# Patient Record
Sex: MALE | Race: WHITE | Employment: OTHER | URBAN - METROPOLITAN AREA
[De-identification: names, ages, dates, MRNs, and addresses within clinical notes are randomized per-mention and may not be internally consistent; named-entity substitution may affect disease eponyms.]

---

## 2017-01-12 ENCOUNTER — OFFICE VISIT (OUTPATIENT)
Dept: UROLOGY | Age: 67
End: 2017-01-12

## 2017-01-12 VITALS
WEIGHT: 150 LBS | HEART RATE: 99 BPM | BODY MASS INDEX: 22.22 KG/M2 | SYSTOLIC BLOOD PRESSURE: 120 MMHG | HEIGHT: 69 IN | DIASTOLIC BLOOD PRESSURE: 82 MMHG

## 2017-01-12 DIAGNOSIS — R33.9 URINARY RETENTION: Primary | ICD-10-CM

## 2017-01-12 LAB — POST VOID RESIDUAL (PVR): >999 ML

## 2017-01-12 PROCEDURE — 4004F PT TOBACCO SCREEN RCVD TLK: CPT | Performed by: UROLOGY

## 2017-01-12 PROCEDURE — 3017F COLORECTAL CA SCREEN DOC REV: CPT | Performed by: UROLOGY

## 2017-01-12 PROCEDURE — G8419 CALC BMI OUT NRM PARAM NOF/U: HCPCS | Performed by: UROLOGY

## 2017-01-12 PROCEDURE — 1123F ACP DISCUSS/DSCN MKR DOCD: CPT | Performed by: UROLOGY

## 2017-01-12 PROCEDURE — G8427 DOCREV CUR MEDS BY ELIG CLIN: HCPCS | Performed by: UROLOGY

## 2017-01-12 PROCEDURE — G8484 FLU IMMUNIZE NO ADMIN: HCPCS | Performed by: UROLOGY

## 2017-01-12 PROCEDURE — 51798 US URINE CAPACITY MEASURE: CPT | Performed by: UROLOGY

## 2017-01-12 PROCEDURE — 99213 OFFICE O/P EST LOW 20 MIN: CPT | Performed by: UROLOGY

## 2017-01-12 PROCEDURE — 4040F PNEUMOC VAC/ADMIN/RCVD: CPT | Performed by: UROLOGY

## 2017-01-12 RX ORDER — RISPERIDONE 3 MG/1
TABLET, FILM COATED ORAL
Refills: 2 | COMMUNITY
Start: 2016-12-31 | End: 2020-02-18 | Stop reason: ALTCHOICE

## 2017-01-12 RX ORDER — TAMSULOSIN HYDROCHLORIDE 0.4 MG/1
CAPSULE ORAL
Qty: 180 CAPSULE | Refills: 3 | Status: SHIPPED | OUTPATIENT
Start: 2017-01-12 | End: 2018-01-07 | Stop reason: SDUPTHER

## 2017-01-12 RX ORDER — BENZTROPINE MESYLATE 1 MG/1
1 TABLET ORAL 2 TIMES DAILY
Refills: 2 | COMMUNITY
Start: 2016-12-31

## 2017-01-12 RX ORDER — HALOPERIDOL DECANOATE 100 MG/ML
INJECTION INTRAMUSCULAR
COMMUNITY
End: 2020-02-18 | Stop reason: ALTCHOICE

## 2017-10-05 ENCOUNTER — OFFICE VISIT (OUTPATIENT)
Dept: PULMONOLOGY | Age: 67
End: 2017-10-05

## 2017-10-05 ENCOUNTER — HOSPITAL ENCOUNTER (OUTPATIENT)
Dept: GENERAL RADIOLOGY | Age: 67
Discharge: HOME | End: 2017-10-05
Payer: MEDICARE

## 2017-10-05 VITALS
OXYGEN SATURATION: 96 % | HEIGHT: 69 IN | WEIGHT: 157 LBS | SYSTOLIC BLOOD PRESSURE: 130 MMHG | HEART RATE: 95 BPM | BODY MASS INDEX: 23.25 KG/M2 | TEMPERATURE: 98 F | DIASTOLIC BLOOD PRESSURE: 80 MMHG

## 2017-10-05 DIAGNOSIS — Z72.0 TOBACCO ABUSE: ICD-10-CM

## 2017-10-05 DIAGNOSIS — J44.9 CHRONIC OBSTRUCTIVE PULMONARY DISEASE, UNSPECIFIED COPD TYPE (HCC): ICD-10-CM

## 2017-10-05 DIAGNOSIS — J61 ASBESTOSIS (HCC): ICD-10-CM

## 2017-10-05 PROCEDURE — G8427 DOCREV CUR MEDS BY ELIG CLIN: HCPCS | Performed by: INTERNAL MEDICINE

## 2017-10-05 PROCEDURE — 3017F COLORECTAL CA SCREEN DOC REV: CPT | Performed by: INTERNAL MEDICINE

## 2017-10-05 PROCEDURE — G8926 SPIRO NO PERF OR DOC: HCPCS | Performed by: INTERNAL MEDICINE

## 2017-10-05 PROCEDURE — G8420 CALC BMI NORM PARAMETERS: HCPCS | Performed by: INTERNAL MEDICINE

## 2017-10-05 PROCEDURE — 1123F ACP DISCUSS/DSCN MKR DOCD: CPT | Performed by: INTERNAL MEDICINE

## 2017-10-05 PROCEDURE — G8484 FLU IMMUNIZE NO ADMIN: HCPCS | Performed by: INTERNAL MEDICINE

## 2017-10-05 PROCEDURE — 3023F SPIROM DOC REV: CPT | Performed by: INTERNAL MEDICINE

## 2017-10-05 PROCEDURE — 4040F PNEUMOC VAC/ADMIN/RCVD: CPT | Performed by: INTERNAL MEDICINE

## 2017-10-05 PROCEDURE — 71020 XR CHEST STANDARD TWO VW: CPT

## 2017-10-05 PROCEDURE — 99214 OFFICE O/P EST MOD 30 MIN: CPT | Performed by: INTERNAL MEDICINE

## 2017-10-05 PROCEDURE — 4004F PT TOBACCO SCREEN RCVD TLK: CPT | Performed by: INTERNAL MEDICINE

## 2017-10-05 RX ORDER — ALBUTEROL SULFATE 90 UG/1
2 AEROSOL, METERED RESPIRATORY (INHALATION) EVERY 6 HOURS PRN
COMMUNITY

## 2017-10-05 ASSESSMENT — ENCOUNTER SYMPTOMS
COUGH: 1
SHORTNESS OF BREATH: 0

## 2017-10-05 NOTE — MR AVS SNAPSHOT
After Visit Summary             Debora Hatchet   10/5/2017 11:00 AM   Office Visit    Description:  Male : 1950   Provider:  Karen Lo MD   Department:  [facility]              Your Follow-Up and Future Appointments         Below is a list of your follow-up and future appointments. This may not be a complete list as you may have made appointments directly with providers that we are not aware of or your providers may have made some for you. Please call your providers to confirm appointments. It is important to keep your appointments. Please bring your current insurance card, photo ID, co-pay, and all medication bottles to your appointment. If self-pay, payment is expected at the time of service. Your To-Do List     Future Appointments Provider Department Dept Phone    2018 10:30 AM Karen Lo MD [facility] [phone]    Please arrive 15 minutes prior to appointment time, bring insurance card and photo ID. Future Orders Complete By Expires    FULL PFT STUDY WITH PRE AND POST [PFT30 Custom]  10/19/2017 10/5/2018    XR CHEST STANDARD (2 VW) [79529 Custom]  10/19/2017 10/5/2018    Follow-Up    Return in about 3 months (around 2018) for COPD.          Information from Your Visit        Department     Name Address Phone Fax    [facility] [address] [phone]      You Were Seen for:         Comments    Chronic obstructive pulmonary disease, unspecified COPD type [facility])   [[id number]]         Vital Signs     Blood Pressure Pulse Temperature Height Weight Oxygen Saturation    130/80 (Site: Left Arm, Position: Sitting, Cuff Size: Medium Adult) 95 98 °F (36.7 °C) (Oral) 5' 9\" (1.753 m) 157 lb (71.2 kg) 96%    Body Mass Index Smoking Status                23.18 kg/m2 Current Every Day Smoker             Today's Medication Changes These changes are accurate as of: 10/5/17 11:52 AM.  If you have any questions, ask your nurse or doctor. CHANGE how you take these medications           tamsulosin 0.4 MG capsule   Commonly known as:  FLOMAX   Instructions: Take two at night   Quantity:  180 capsule   Refills:  3   What changed:  Another medication with the same name was removed. Continue taking this medication, and follow the directions you see here.    Changed by:  Harleen Kumari MD            Where to Get Your Medications      These medications were sent to [address] [place] [address] - [address] - P [phone] - F [phone]  [address], [facility] [address]    Hours:  24-hours Phone:  [phone]     umeclidinium-vilanterol 62.5-25 MCG/INH Aepb inhaler               Your Current Medications Are              albuterol sulfate  (90 Base) MCG/ACT inhaler Inhale 2 puffs into the lungs every 6 hours as needed for Wheezing    umeclidinium-vilanterol (ANORO ELLIPTA) 62.5-25 MCG/INH AEPB inhaler Inhale 1 puff into the lungs daily    haloperidol decanoate (HALDOL DECANOATE) 100 MG/ML injection Inject into the muscle every 30 days    CRANBERRY CONCENTRATE PO Take by mouth    benztropine (COGENTIN) 1 MG tablet TAKE ONE TABLET BY MOUTH AT BEDTIME    risperiDONE (RISPERDAL) 3 MG tablet TAKE ONE TABLET BY MOUTH AT BEDTIME    tamsulosin (FLOMAX) 0.4 MG capsule Take two at night    divalproex (DEPAKOTE ER) 250 MG extended release tablet Take 3 tablets by mouth nightly      Allergies              Bee Venom Hives    Benadryl [Diphenhydramine] Hives    Tylenol [Acetaminophen] Itching         Additional Information        Basic Information     Date Of Birth Sex Race Ethnicity Preferred Language    1950 Male White Non-/Non  English      Problem List as of 10/5/2017  Date Reviewed: 10/5/2017                COPD (chronic obstructive pulmonary disease) (HCC)    Abscess of arm, right later time if you forget your password. 8. Enter your e-mail address. You will receive e-mail notification when new information is available in [address] 9. Click Sign Up. You can now view your medical record. Additional Information  If you have questions, please contact the physician practice where you receive care. Remember, [employer] is NOT to be used for urgent needs. For medical emergencies, dial 911. For questions regarding your [employer] account call [phone]. If you have a clinical question, please call your doctor's office.

## 2017-10-05 NOTE — PROGRESS NOTES
Subjective:     Sebastian Gann is a 78 y.o. male who complains today of:     Chief Complaint   Patient presents with    COPD     follow up       HPI  Patient is using anoro ellipta 1 puff daily. proair respiclick 2 puff prn   No c/o  shortness of breath   No wheezing   C/o Cough with  Clear  Sputum in morning. No Hemoptysis  No Chest pain or pleuritic pain  No Fever or chills. No Rhinorrhea or postnasal drip. He used to smoke 3 ppd but now smoking 1 ppd. Allergies:  Bee venom; Benadryl [diphenhydramine]; and Tylenol [acetaminophen]  Past Medical History:   Diagnosis Date    [facility]osis [facility]     [facility]     schizo    COPD (chronic obstructive [facility] disease) ([facility]      Past Surgical History:   Procedure Laterality Date    INCISION AND DRAINAGE Right 11/22/2016    ARM INCISION AND DRAINAGE with  debridement performed by Cynthia Carlson MD at [address] History   Problem Relation Age of Onset    Hypertension Mother     No Known Problems Father      Social History     Social History    Marital status: Single     Spouse name: N/A    Number of children: N/A    Years of education: N/A     Occupational History    Not on file. Social History Main Topics    Smoking status: Current Every Day Smoker     Packs/day: 1.00     Years: 25.00     Types: Cigarettes    Smokeless tobacco: Never Used    Alcohol use No    Drug use: No    Sexual activity: Not Currently     Other Topics Concern    Not on file     Social History Narrative         Review of Systems   Respiratory: Positive for cough. Negative for shortness of breath. Objective:     Vitals:    10/05/17 1124   BP: 130/80   Site: Left Arm   Position: Sitting   Cuff Size: Medium Adult   Pulse: 95   Temp: 98 °F (36.7 °C)   TempSrc: Oral   SpO2: 96%   Weight: 157 lb (71.2 kg)   Height: 5' 9\" (1.753 m)         Physical Exam   Constitutional: He is oriented to person, place, and time.  He appears well-developed and necessary. He is not having shortness of breath or wheezing he does have a cough in the morning which is chronic in nature. Refill of Anoro Ellipta done    - XR CHEST STANDARD (2 VW); Future  - FULL PFT STUDY WITH PRE AND POST; Future  - umeclidinium-vilanterol (ANORO ELLIPTA) 62.5-25 MCG/INH AEPB inhaler; Inhale 1 puff into the lungs daily  Dispense: 1 each; Refill: 3    2. Asbestosis [facility])  Patient said he had worked in [employer] and he was exposed to asbestos. He was evaluated for asbestosis and past and he was told he has asbestosis. Chest x-ray requested  - XR CHEST STANDARD (2 VW); Future    3. Tobacco abuse  Patient used to smoke 3 packs per day, he has cut down but he is still smoking 1 pack a day  Patient advised try to quit smoking. Risks related to smoking explained to the patient. Return in about 3 months (around 1/5/2018) for COPD.       Jada Fan MD General

## 2017-11-09 ENCOUNTER — HOSPITAL ENCOUNTER (OUTPATIENT)
Dept: PULMONOLOGY | Age: 67
Discharge: HOME | End: 2017-11-09
Payer: MEDICARE

## 2018-01-08 RX ORDER — TAMSULOSIN HYDROCHLORIDE 0.4 MG/1
CAPSULE ORAL
Qty: 180 CAPSULE | Refills: 0 | Status: SHIPPED | OUTPATIENT
Start: 2018-01-08 | End: 2018-04-01 | Stop reason: SDUPTHER

## 2018-04-03 RX ORDER — TAMSULOSIN HYDROCHLORIDE 0.4 MG/1
CAPSULE ORAL
Qty: 180 CAPSULE | Refills: 3 | Status: SHIPPED | OUTPATIENT
Start: 2018-04-03 | End: 2020-02-18 | Stop reason: ALTCHOICE

## 2018-07-17 DIAGNOSIS — J44.9 CHRONIC OBSTRUCTIVE PULMONARY DISEASE, UNSPECIFIED COPD TYPE (HCC): ICD-10-CM

## 2018-07-18 RX ORDER — UMECLIDINIUM BROMIDE AND VILANTEROL TRIFENATATE 62.5; 25 UG/1; UG/1
POWDER RESPIRATORY (INHALATION)
Qty: 1 EACH | Refills: 3 | Status: SHIPPED | OUTPATIENT
Start: 2018-07-18 | End: 2020-02-18 | Stop reason: ALTCHOICE

## 2019-02-21 ENCOUNTER — APPOINTMENT (OUTPATIENT)
Dept: GENERAL RADIOLOGY | Age: 69
DRG: 871 | End: 2019-02-21
Payer: MEDICARE

## 2019-02-21 ENCOUNTER — APPOINTMENT (OUTPATIENT)
Dept: CT IMAGING | Age: 69
DRG: 871 | End: 2019-02-21
Payer: MEDICARE

## 2019-02-21 ENCOUNTER — HOSPITAL ENCOUNTER (INPATIENT)
Age: 69
LOS: 5 days | Discharge: HOME | DRG: 871 | End: 2019-02-28
Attending: STUDENT IN AN ORGANIZED HEALTH CARE EDUCATION/TRAINING PROGRAM | Admitting: INTERNAL MEDICINE
Payer: MEDICARE
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
## 2019-02-21 DIAGNOSIS — T17.908A ASPIRATION INTO AIRWAY, INITIAL ENCOUNTER: ICD-10-CM

## 2019-02-21 DIAGNOSIS — R50.9 ACUTE FEBRILE ILLNESS: ICD-10-CM

## 2019-02-21 DIAGNOSIS — N17.9 AKI (ACUTE KIDNEY INJURY) (HCC): ICD-10-CM

## 2019-02-21 DIAGNOSIS — S83.105A LEFT KNEE DISLOCATION, INITIAL ENCOUNTER: ICD-10-CM

## 2019-02-21 DIAGNOSIS — R65.10 SIRS (SYSTEMIC INFLAMMATORY RESPONSE SYNDROME) (HCC): ICD-10-CM

## 2019-02-21 DIAGNOSIS — R41.0 DISORIENTATION: Primary | ICD-10-CM

## 2019-02-21 DIAGNOSIS — F14.10 COCAINE ABUSE (HCC): ICD-10-CM

## 2019-02-21 DIAGNOSIS — S09.90XA CLOSED HEAD INJURY, INITIAL ENCOUNTER: ICD-10-CM

## 2019-02-21 LAB
ABO/RH: NORMAL
ALBUMIN SERPL-MCNC: 4.4 G/DL (ref 3.5–4.6)
ALP BLD-CCNC: 56 U/L (ref 35–104)
ALT SERPL-CCNC: 14 U/L (ref 0–41)
AMMONIA: 35 UMOL/L (ref 16–60)
AMPHETAMINE SCREEN, URINE: ABNORMAL
ANION GAP SERPL CALCULATED.3IONS-SCNC: 18 MEQ/L (ref 9–15)
ANTIBODY SCREEN: NORMAL
APTT: 30.7 SEC (ref 21.6–35.4)
AST SERPL-CCNC: 31 U/L (ref 0–40)
BACTERIA: NEGATIVE /HPF
BARBITURATE SCREEN URINE: ABNORMAL
BASOPHILS ABSOLUTE: 0 K/UL (ref 0–0.2)
BASOPHILS RELATIVE PERCENT: 0.3 %
BENZODIAZEPINE SCREEN, URINE: ABNORMAL
BILIRUB SERPL-MCNC: 0.3 MG/DL (ref 0.2–0.7)
BILIRUBIN URINE: NEGATIVE
BLOOD, URINE: ABNORMAL
BUN BLDV-MCNC: 24 MG/DL (ref 8–23)
CALCIUM SERPL-MCNC: 8.5 MG/DL (ref 8.5–9.9)
CANNABINOID SCREEN URINE: POSITIVE
CHLORIDE BLD-SCNC: 95 MEQ/L (ref 95–107)
CHP ED QC CHECK: YES
CK MB: 4.3 NG/ML (ref 0–6.7)
CLARITY: CLEAR
CO2: 22 MEQ/L (ref 20–31)
COCAINE METABOLITE SCREEN URINE: POSITIVE
COLOR: YELLOW
CREAT SERPL-MCNC: 2.33 MG/DL (ref 0.7–1.2)
CREATINE KINASE-MB INDEX: 0.5 % (ref 0–3.5)
EKG ATRIAL RATE: 119 BPM
EKG P AXIS: 70 DEGREES
EKG P-R INTERVAL: 130 MS
EKG Q-T INTERVAL: 312 MS
EKG QRS DURATION: 82 MS
EKG QTC CALCULATION (BAZETT): 438 MS
EKG R AXIS: 65 DEGREES
EKG T AXIS: 64 DEGREES
EKG VENTRICULAR RATE: 119 BPM
EOSINOPHILS ABSOLUTE: 0 K/UL (ref 0–0.7)
EOSINOPHILS RELATIVE PERCENT: 0 %
EPITHELIAL CELLS, UA: ABNORMAL /HPF (ref 0–5)
GFR AFRICAN AMERICAN: 33.9
GFR NON-AFRICAN AMERICAN: 28
GLOBULIN: 3.9 G/DL (ref 2.3–3.5)
GLUCOSE BLD-MCNC: 102 MG/DL (ref 70–99)
GLUCOSE BLD-MCNC: 112 MG/DL
GLUCOSE BLD-MCNC: 112 MG/DL (ref 60–115)
GLUCOSE URINE: NEGATIVE MG/DL
HCT VFR BLD CALC: 36.2 % (ref 42–52)
HEMOGLOBIN: 12.7 G/DL (ref 14–18)
HYALINE CASTS: ABNORMAL /HPF (ref 0–5)
INR BLD: 1.1
KETONES, URINE: NEGATIVE MG/DL
LACTIC ACID: 0.9 MMOL/L (ref 0.5–2.2)
LEUKOCYTE ESTERASE, URINE: NEGATIVE
LYMPHOCYTES ABSOLUTE: 0.4 K/UL (ref 1–4.8)
LYMPHOCYTES RELATIVE PERCENT: 4.9 %
Lab: ABNORMAL
MAGNESIUM: 1.7 MG/DL (ref 1.7–2.4)
MCH RBC QN AUTO: 35.1 PG (ref 27–31.3)
MCHC RBC AUTO-ENTMCNC: 35.2 % (ref 33–37)
MCV RBC AUTO: 99.6 FL (ref 80–100)
MONO TEST: NEGATIVE
MONOCYTES ABSOLUTE: 1 K/UL (ref 0.2–0.8)
MONOCYTES RELATIVE PERCENT: 13.2 %
NEUTROPHILS ABSOLUTE: 6.1 K/UL (ref 1.4–6.5)
NEUTROPHILS RELATIVE PERCENT: 81.6 %
NITRITE, URINE: NEGATIVE
OPIATE SCREEN URINE: ABNORMAL
PDW BLD-RTO: 14 % (ref 11.5–14.5)
PERFORMED ON: NORMAL
PH UA: 6 (ref 5–9)
PHENCYCLIDINE SCREEN URINE: ABNORMAL
PLATELET # BLD: 142 K/UL (ref 130–400)
POTASSIUM SERPL-SCNC: 3.9 MEQ/L (ref 3.4–4.9)
PRO-BNP: 246 PG/ML
PROTEIN UA: NEGATIVE MG/DL
PROTHROMBIN TIME: 11.1 SEC (ref 9–11.5)
RAPID INFLUENZA  B AGN: NEGATIVE
RAPID INFLUENZA A AGN: NEGATIVE
RBC # BLD: 3.63 M/UL (ref 4.7–6.1)
RBC UA: ABNORMAL /HPF (ref 0–5)
S PYO AG THROAT QL: NEGATIVE
SODIUM BLD-SCNC: 135 MEQ/L (ref 135–144)
SPECIFIC GRAVITY UA: 1.01 (ref 1–1.03)
TOTAL CK: 914 U/L (ref 0–190)
TOTAL PROTEIN: 8.3 G/DL (ref 6.3–8)
TROPONIN: <0.01 NG/ML (ref 0–0.01)
URINE REFLEX TO CULTURE: YES
UROBILINOGEN, URINE: 0.2 E.U./DL
WBC # BLD: 7.4 K/UL (ref 4.8–10.8)
WBC UA: ABNORMAL /HPF (ref 0–5)

## 2019-02-21 PROCEDURE — 2580000003 HC RX 258: Performed by: STUDENT IN AN ORGANIZED HEALTH CARE EDUCATION/TRAINING PROGRAM

## 2019-02-21 PROCEDURE — 96365 THER/PROPH/DIAG IV INF INIT: CPT

## 2019-02-21 PROCEDURE — 80053 COMPREHEN METABOLIC PANEL: CPT

## 2019-02-21 PROCEDURE — 86900 BLOOD TYPING SEROLOGIC ABO: CPT

## 2019-02-21 PROCEDURE — 87040 BLOOD CULTURE FOR BACTERIA: CPT

## 2019-02-21 PROCEDURE — 87081 CULTURE SCREEN ONLY: CPT

## 2019-02-21 PROCEDURE — 70450 CT HEAD/BRAIN W/O DYE: CPT

## 2019-02-21 PROCEDURE — 85025 COMPLETE CBC W/AUTO DIFF WBC: CPT

## 2019-02-21 PROCEDURE — 82140 ASSAY OF AMMONIA: CPT

## 2019-02-21 PROCEDURE — 80307 DRUG TEST PRSMV CHEM ANLYZR: CPT

## 2019-02-21 PROCEDURE — 83605 ASSAY OF LACTIC ACID: CPT

## 2019-02-21 PROCEDURE — 93005 ELECTROCARDIOGRAM TRACING: CPT

## 2019-02-21 PROCEDURE — 2580000003 HC RX 258: Performed by: NURSE PRACTITIONER

## 2019-02-21 PROCEDURE — 72125 CT NECK SPINE W/O DYE: CPT

## 2019-02-21 PROCEDURE — G0378 HOSPITAL OBSERVATION PER HR: HCPCS

## 2019-02-21 PROCEDURE — 36415 COLL VENOUS BLD VENIPUNCTURE: CPT

## 2019-02-21 PROCEDURE — 73564 X-RAY EXAM KNEE 4 OR MORE: CPT

## 2019-02-21 PROCEDURE — 86901 BLOOD TYPING SEROLOGIC RH(D): CPT

## 2019-02-21 PROCEDURE — 82553 CREATINE MB FRACTION: CPT

## 2019-02-21 PROCEDURE — 87086 URINE CULTURE/COLONY COUNT: CPT

## 2019-02-21 PROCEDURE — 99285 EMERGENCY DEPT VISIT HI MDM: CPT

## 2019-02-21 PROCEDURE — 81001 URINALYSIS AUTO W/SCOPE: CPT

## 2019-02-21 PROCEDURE — 71045 X-RAY EXAM CHEST 1 VIEW: CPT

## 2019-02-21 PROCEDURE — 6370000000 HC RX 637 (ALT 250 FOR IP): Performed by: STUDENT IN AN ORGANIZED HEALTH CARE EDUCATION/TRAINING PROGRAM

## 2019-02-21 PROCEDURE — 85730 THROMBOPLASTIN TIME PARTIAL: CPT

## 2019-02-21 PROCEDURE — 82550 ASSAY OF CK (CPK): CPT

## 2019-02-21 PROCEDURE — 6370000000 HC RX 637 (ALT 250 FOR IP): Performed by: NURSE PRACTITIONER

## 2019-02-21 PROCEDURE — 83735 ASSAY OF MAGNESIUM: CPT

## 2019-02-21 PROCEDURE — 86850 RBC ANTIBODY SCREEN: CPT

## 2019-02-21 PROCEDURE — 84484 ASSAY OF TROPONIN QUANT: CPT

## 2019-02-21 PROCEDURE — 83880 ASSAY OF NATRIURETIC PEPTIDE: CPT

## 2019-02-21 PROCEDURE — 87804 INFLUENZA ASSAY W/OPTIC: CPT

## 2019-02-21 PROCEDURE — 6360000002 HC RX W HCPCS: Performed by: STUDENT IN AN ORGANIZED HEALTH CARE EDUCATION/TRAINING PROGRAM

## 2019-02-21 PROCEDURE — 87880 STREP A ASSAY W/OPTIC: CPT

## 2019-02-21 PROCEDURE — 86308 HETEROPHILE ANTIBODY SCREEN: CPT

## 2019-02-21 PROCEDURE — 85610 PROTHROMBIN TIME: CPT

## 2019-02-21 RX ORDER — TAMSULOSIN HYDROCHLORIDE 0.4 MG/1
0.4 CAPSULE ORAL DAILY
Status: DISCONTINUED | OUTPATIENT
Start: 2019-02-21 | End: 2019-02-28 | Stop reason: HOSPADM

## 2019-02-21 RX ORDER — ONDANSETRON 2 MG/ML
4 INJECTION INTRAMUSCULAR; INTRAVENOUS EVERY 6 HOURS PRN
Status: DISCONTINUED | OUTPATIENT
Start: 2019-02-21 | End: 2019-02-28 | Stop reason: HOSPADM

## 2019-02-21 RX ORDER — ALBUTEROL SULFATE 90 UG/1
2 AEROSOL, METERED RESPIRATORY (INHALATION) EVERY 6 HOURS PRN
Status: DISCONTINUED | OUTPATIENT
Start: 2019-02-21 | End: 2019-02-28 | Stop reason: HOSPADM

## 2019-02-21 RX ORDER — THIAMINE MONONITRATE (VIT B1) 100 MG
100 TABLET ORAL DAILY
Status: DISCONTINUED | OUTPATIENT
Start: 2019-02-21 | End: 2019-02-28 | Stop reason: HOSPADM

## 2019-02-21 RX ORDER — ACETAMINOPHEN 325 MG/1
650 TABLET ORAL EVERY 4 HOURS PRN
Status: DISCONTINUED | OUTPATIENT
Start: 2019-02-21 | End: 2019-02-28 | Stop reason: HOSPADM

## 2019-02-21 RX ORDER — SODIUM CHLORIDE 0.9 % (FLUSH) 0.9 %
10 SYRINGE (ML) INJECTION PRN
Status: DISCONTINUED | OUTPATIENT
Start: 2019-02-21 | End: 2019-02-28 | Stop reason: HOSPADM

## 2019-02-21 RX ORDER — HALOPERIDOL 5 MG
10 TABLET ORAL DAILY
Status: DISCONTINUED | OUTPATIENT
Start: 2019-02-22 | End: 2019-02-22

## 2019-02-21 RX ORDER — OSELTAMIVIR PHOSPHATE 30 MG/1
30 CAPSULE ORAL ONCE
Status: COMPLETED | OUTPATIENT
Start: 2019-02-21 | End: 2019-02-21

## 2019-02-21 RX ORDER — BENZTROPINE MESYLATE 1 MG/1
1 TABLET ORAL NIGHTLY
Status: DISCONTINUED | OUTPATIENT
Start: 2019-02-21 | End: 2019-02-28 | Stop reason: HOSPADM

## 2019-02-21 RX ORDER — 0.9 % SODIUM CHLORIDE 0.9 %
1000 INTRAVENOUS SOLUTION INTRAVENOUS ONCE
Status: COMPLETED | OUTPATIENT
Start: 2019-02-21 | End: 2019-02-21

## 2019-02-21 RX ORDER — FOLIC ACID 1 MG/1
1 TABLET ORAL DAILY
Status: DISCONTINUED | OUTPATIENT
Start: 2019-02-21 | End: 2019-02-28 | Stop reason: HOSPADM

## 2019-02-21 RX ORDER — SODIUM CHLORIDE 0.9 % (FLUSH) 0.9 %
10 SYRINGE (ML) INJECTION EVERY 12 HOURS SCHEDULED
Status: DISCONTINUED | OUTPATIENT
Start: 2019-02-21 | End: 2019-02-28 | Stop reason: HOSPADM

## 2019-02-21 RX ORDER — SODIUM CHLORIDE 9 MG/ML
INJECTION, SOLUTION INTRAVENOUS CONTINUOUS
Status: DISCONTINUED | OUTPATIENT
Start: 2019-02-21 | End: 2019-02-28 | Stop reason: HOSPADM

## 2019-02-21 RX ORDER — HALOPERIDOL 10 MG/1
10 TABLET ORAL DAILY
COMMUNITY
End: 2020-02-18 | Stop reason: ALTCHOICE

## 2019-02-21 RX ORDER — ACETAMINOPHEN 500 MG
1000 TABLET ORAL ONCE
Status: COMPLETED | OUTPATIENT
Start: 2019-02-21 | End: 2019-02-21

## 2019-02-21 RX ADMIN — SODIUM CHLORIDE: 9 INJECTION, SOLUTION INTRAVENOUS at 23:30

## 2019-02-21 RX ADMIN — BENZTROPINE MESYLATE 1 MG: 1 TABLET ORAL at 23:30

## 2019-02-21 RX ADMIN — RISPERIDONE 3 MG: 1 TABLET ORAL at 23:30

## 2019-02-21 RX ADMIN — SODIUM CHLORIDE 1000 ML: 9 INJECTION, SOLUTION INTRAVENOUS at 20:26

## 2019-02-21 RX ADMIN — Medication 10 ML: at 23:31

## 2019-02-21 RX ADMIN — ACETAMINOPHEN 650 MG: 325 TABLET ORAL at 23:30

## 2019-02-21 RX ADMIN — SODIUM CHLORIDE 1000 ML: 9 INJECTION, SOLUTION INTRAVENOUS at 20:27

## 2019-02-21 RX ADMIN — PIPERACILLIN SODIUM,TAZOBACTAM SODIUM 3.38 G: 3; .375 INJECTION, POWDER, FOR SOLUTION INTRAVENOUS at 20:26

## 2019-02-21 RX ADMIN — OSELTAMIVIR PHOSPHATE 30 MG: 30 CAPSULE ORAL at 20:54

## 2019-02-21 RX ADMIN — Medication 100 MG: at 23:30

## 2019-02-21 RX ADMIN — TAMSULOSIN HYDROCHLORIDE 0.4 MG: 0.4 CAPSULE ORAL at 23:30

## 2019-02-21 RX ADMIN — ACETAMINOPHEN 1000 MG: 500 TABLET ORAL at 21:13

## 2019-02-21 RX ADMIN — FOLIC ACID 1 MG: 1 TABLET ORAL at 23:30

## 2019-02-21 RX ADMIN — DIVALPROEX SODIUM 750 MG: 500 TABLET, EXTENDED RELEASE ORAL at 23:30

## 2019-02-21 ASSESSMENT — PAIN DESCRIPTION - ORIENTATION: ORIENTATION: LEFT

## 2019-02-21 ASSESSMENT — PAIN SCALES - GENERAL
PAINLEVEL_OUTOF10: 0
PAINLEVEL_OUTOF10: 4
PAINLEVEL_OUTOF10: 0

## 2019-02-21 ASSESSMENT — ENCOUNTER SYMPTOMS
BACK PAIN: 0
CHEST TIGHTNESS: 0
DIARRHEA: 0
ABDOMINAL PAIN: 0
SINUS PRESSURE: 0
COUGH: 0
TROUBLE SWALLOWING: 0
VOMITING: 0
SHORTNESS OF BREATH: 0

## 2019-02-21 ASSESSMENT — PAIN DESCRIPTION - DESCRIPTORS: DESCRIPTORS: ACHING

## 2019-02-21 ASSESSMENT — PAIN DESCRIPTION - LOCATION: LOCATION: KNEE

## 2019-02-21 ASSESSMENT — PAIN DESCRIPTION - FREQUENCY: FREQUENCY: CONTINUOUS

## 2019-02-21 ASSESSMENT — PAIN DESCRIPTION - ONSET: ONSET: ON-GOING

## 2019-02-22 ENCOUNTER — APPOINTMENT (OUTPATIENT)
Dept: MRI IMAGING | Age: 69
DRG: 871 | End: 2019-02-22
Payer: MEDICARE

## 2019-02-22 ENCOUNTER — APPOINTMENT (OUTPATIENT)
Dept: CT IMAGING | Age: 69
DRG: 871 | End: 2019-02-22
Payer: MEDICARE

## 2019-02-22 ENCOUNTER — APPOINTMENT (OUTPATIENT)
Dept: ULTRASOUND IMAGING | Age: 69
DRG: 871 | End: 2019-02-22
Payer: MEDICARE

## 2019-02-22 LAB
ANION GAP SERPL CALCULATED.3IONS-SCNC: 17 MEQ/L (ref 9–15)
BASOPHILS ABSOLUTE: 0 K/UL (ref 0–0.2)
BASOPHILS RELATIVE PERCENT: 0.6 %
BUN BLDV-MCNC: 20 MG/DL (ref 8–23)
CALCIUM SERPL-MCNC: 8.3 MG/DL (ref 8.5–9.9)
CHLORIDE BLD-SCNC: 104 MEQ/L (ref 95–107)
CO2: 20 MEQ/L (ref 20–31)
CREAT SERPL-MCNC: 2.03 MG/DL (ref 0.7–1.2)
CREATININE URINE: 74 MG/DL
EOSINOPHILS ABSOLUTE: 0 K/UL (ref 0–0.7)
EOSINOPHILS RELATIVE PERCENT: 0 %
GFR AFRICAN AMERICAN: 39.8
GFR NON-AFRICAN AMERICAN: 32.9
GLUCOSE BLD-MCNC: 90 MG/DL (ref 70–99)
HCT VFR BLD CALC: 32.3 % (ref 42–52)
HEMOGLOBIN: 11.1 G/DL (ref 14–18)
LYMPHOCYTES ABSOLUTE: 0.4 K/UL (ref 1–4.8)
LYMPHOCYTES RELATIVE PERCENT: 6.7 %
MCH RBC QN AUTO: 34.7 PG (ref 27–31.3)
MCHC RBC AUTO-ENTMCNC: 34.5 % (ref 33–37)
MCV RBC AUTO: 100.5 FL (ref 80–100)
MONOCYTES ABSOLUTE: 0.9 K/UL (ref 0.2–0.8)
MONOCYTES RELATIVE PERCENT: 14.9 %
NEUTROPHILS ABSOLUTE: 4.9 K/UL (ref 1.4–6.5)
NEUTROPHILS RELATIVE PERCENT: 77.8 %
PDW BLD-RTO: 14.2 % (ref 11.5–14.5)
PLATELET # BLD: 113 K/UL (ref 130–400)
POTASSIUM REFLEX MAGNESIUM: 3.9 MEQ/L (ref 3.4–4.9)
RBC # BLD: 3.21 M/UL (ref 4.7–6.1)
SODIUM BLD-SCNC: 141 MEQ/L (ref 135–144)
SODIUM URINE: 64 MEQ/L
WBC # BLD: 6.3 K/UL (ref 4.8–10.8)

## 2019-02-22 PROCEDURE — 99223 1ST HOSP IP/OBS HIGH 75: CPT | Performed by: INTERNAL MEDICINE

## 2019-02-22 PROCEDURE — 94640 AIRWAY INHALATION TREATMENT: CPT

## 2019-02-22 PROCEDURE — 70551 MRI BRAIN STEM W/O DYE: CPT

## 2019-02-22 PROCEDURE — 71250 CT THORAX DX C-: CPT

## 2019-02-22 PROCEDURE — 6370000000 HC RX 637 (ALT 250 FOR IP): Performed by: NURSE PRACTITIONER

## 2019-02-22 PROCEDURE — 93010 ELECTROCARDIOGRAM REPORT: CPT | Performed by: INTERNAL MEDICINE

## 2019-02-22 PROCEDURE — 6360000002 HC RX W HCPCS: Performed by: INTERNAL MEDICINE

## 2019-02-22 PROCEDURE — 96372 THER/PROPH/DIAG INJ SC/IM: CPT

## 2019-02-22 PROCEDURE — G0378 HOSPITAL OBSERVATION PER HR: HCPCS

## 2019-02-22 PROCEDURE — 2580000003 HC RX 258: Performed by: INTERNAL MEDICINE

## 2019-02-22 PROCEDURE — 36415 COLL VENOUS BLD VENIPUNCTURE: CPT

## 2019-02-22 PROCEDURE — 94664 DEMO&/EVAL PT USE INHALER: CPT

## 2019-02-22 PROCEDURE — 80048 BASIC METABOLIC PNL TOTAL CA: CPT

## 2019-02-22 PROCEDURE — 93880 EXTRACRANIAL BILAT STUDY: CPT

## 2019-02-22 PROCEDURE — 84300 ASSAY OF URINE SODIUM: CPT

## 2019-02-22 PROCEDURE — 6360000002 HC RX W HCPCS: Performed by: NURSE PRACTITIONER

## 2019-02-22 PROCEDURE — 82570 ASSAY OF URINE CREATININE: CPT

## 2019-02-22 PROCEDURE — 2580000003 HC RX 258: Performed by: NURSE PRACTITIONER

## 2019-02-22 PROCEDURE — 76775 US EXAM ABDO BACK WALL LIM: CPT

## 2019-02-22 PROCEDURE — 96367 TX/PROPH/DG ADDL SEQ IV INF: CPT

## 2019-02-22 PROCEDURE — 85025 COMPLETE CBC W/AUTO DIFF WBC: CPT

## 2019-02-22 RX ORDER — DIVALPROEX SODIUM 500 MG/1
1000 TABLET, EXTENDED RELEASE ORAL NIGHTLY
Status: DISCONTINUED | OUTPATIENT
Start: 2019-02-23 | End: 2019-02-28 | Stop reason: HOSPADM

## 2019-02-22 RX ORDER — HALOPERIDOL 5 MG
10 TABLET ORAL EVERY EVENING
Status: DISCONTINUED | OUTPATIENT
Start: 2019-02-23 | End: 2019-02-28 | Stop reason: HOSPADM

## 2019-02-22 RX ADMIN — RISPERIDONE 3 MG: 1 TABLET ORAL at 20:31

## 2019-02-22 RX ADMIN — TAMSULOSIN HYDROCHLORIDE 0.4 MG: 0.4 CAPSULE ORAL at 10:33

## 2019-02-22 RX ADMIN — GLYCOPYRROLATE AND FORMOTEROL FUMARATE 2 PUFF: 9; 4.8 AEROSOL, METERED RESPIRATORY (INHALATION) at 20:27

## 2019-02-22 RX ADMIN — ENOXAPARIN SODIUM 40 MG: 40 INJECTION SUBCUTANEOUS at 10:33

## 2019-02-22 RX ADMIN — ACETAMINOPHEN 650 MG: 325 TABLET ORAL at 10:33

## 2019-02-22 RX ADMIN — Medication 10 ML: at 10:33

## 2019-02-22 RX ADMIN — AZITHROMYCIN MONOHYDRATE 500 MG: 500 INJECTION, POWDER, LYOPHILIZED, FOR SOLUTION INTRAVENOUS at 18:30

## 2019-02-22 RX ADMIN — CEFTRIAXONE SODIUM 1 G: 1 INJECTION, POWDER, FOR SOLUTION INTRAMUSCULAR; INTRAVENOUS at 18:05

## 2019-02-22 RX ADMIN — FOLIC ACID 1 MG: 1 TABLET ORAL at 10:33

## 2019-02-22 RX ADMIN — Medication 10 ML: at 20:32

## 2019-02-22 RX ADMIN — Medication 100 MG: at 10:32

## 2019-02-22 RX ADMIN — DIVALPROEX SODIUM 750 MG: 500 TABLET, EXTENDED RELEASE ORAL at 20:30

## 2019-02-22 RX ADMIN — ACETAMINOPHEN 650 MG: 325 TABLET ORAL at 15:42

## 2019-02-22 RX ADMIN — BENZTROPINE MESYLATE 1 MG: 1 TABLET ORAL at 20:31

## 2019-02-22 RX ADMIN — GLYCOPYRROLATE AND FORMOTEROL FUMARATE 2 PUFF: 9; 4.8 AEROSOL, METERED RESPIRATORY (INHALATION) at 07:43

## 2019-02-22 RX ADMIN — HALOPERIDOL 10 MG: 5 TABLET ORAL at 10:32

## 2019-02-22 RX ADMIN — ACETAMINOPHEN 650 MG: 325 TABLET ORAL at 20:31

## 2019-02-22 ASSESSMENT — PAIN SCALES - GENERAL
PAINLEVEL_OUTOF10: 0

## 2019-02-23 PROBLEM — J18.9 PNA (PNEUMONIA): Status: ACTIVE | Noted: 2019-02-23

## 2019-02-23 LAB — URINE CULTURE, ROUTINE: NORMAL

## 2019-02-23 PROCEDURE — 6370000000 HC RX 637 (ALT 250 FOR IP): Performed by: NURSE PRACTITIONER

## 2019-02-23 PROCEDURE — 2580000003 HC RX 258: Performed by: INTERNAL MEDICINE

## 2019-02-23 PROCEDURE — G8979 MOBILITY GOAL STATUS: HCPCS

## 2019-02-23 PROCEDURE — 6360000002 HC RX W HCPCS: Performed by: INTERNAL MEDICINE

## 2019-02-23 PROCEDURE — 1210000000 HC MED SURG R&B

## 2019-02-23 PROCEDURE — G8978 MOBILITY CURRENT STATUS: HCPCS

## 2019-02-23 PROCEDURE — 6370000000 HC RX 637 (ALT 250 FOR IP): Performed by: INTERNAL MEDICINE

## 2019-02-23 PROCEDURE — 2580000003 HC RX 258: Performed by: NURSE PRACTITIONER

## 2019-02-23 PROCEDURE — 96366 THER/PROPH/DIAG IV INF ADDON: CPT

## 2019-02-23 PROCEDURE — 6360000002 HC RX W HCPCS: Performed by: NURSE PRACTITIONER

## 2019-02-23 PROCEDURE — G8987 SELF CARE CURRENT STATUS: HCPCS

## 2019-02-23 PROCEDURE — 99232 SBSQ HOSP IP/OBS MODERATE 35: CPT | Performed by: INTERNAL MEDICINE

## 2019-02-23 PROCEDURE — 97166 OT EVAL MOD COMPLEX 45 MIN: CPT

## 2019-02-23 PROCEDURE — 94640 AIRWAY INHALATION TREATMENT: CPT

## 2019-02-23 PROCEDURE — 31720 CLEARANCE OF AIRWAYS: CPT

## 2019-02-23 PROCEDURE — 96372 THER/PROPH/DIAG INJ SC/IM: CPT

## 2019-02-23 PROCEDURE — G8988 SELF CARE GOAL STATUS: HCPCS

## 2019-02-23 PROCEDURE — 6370000000 HC RX 637 (ALT 250 FOR IP): Performed by: PHYSICIAN ASSISTANT

## 2019-02-23 PROCEDURE — 97162 PT EVAL MOD COMPLEX 30 MIN: CPT

## 2019-02-23 RX ORDER — NICOTINE 21 MG/24HR
1 PATCH, TRANSDERMAL 24 HOURS TRANSDERMAL DAILY
Status: DISCONTINUED | OUTPATIENT
Start: 2019-02-23 | End: 2019-02-28 | Stop reason: HOSPADM

## 2019-02-23 RX ORDER — CETIRIZINE HYDROCHLORIDE 10 MG/1
10 TABLET ORAL DAILY
Status: DISCONTINUED | OUTPATIENT
Start: 2019-02-23 | End: 2019-02-28 | Stop reason: HOSPADM

## 2019-02-23 RX ORDER — LEVOFLOXACIN 5 MG/ML
750 INJECTION, SOLUTION INTRAVENOUS
Status: DISCONTINUED | OUTPATIENT
Start: 2019-02-23 | End: 2019-02-28 | Stop reason: HOSPADM

## 2019-02-23 RX ADMIN — ACETAMINOPHEN 650 MG: 325 TABLET ORAL at 10:53

## 2019-02-23 RX ADMIN — CETIRIZINE HYDROCHLORIDE 10 MG: 10 TABLET, FILM COATED ORAL at 10:53

## 2019-02-23 RX ADMIN — Medication 10 ML: at 10:00

## 2019-02-23 RX ADMIN — ACETAMINOPHEN 650 MG: 325 TABLET ORAL at 00:58

## 2019-02-23 RX ADMIN — ACETAMINOPHEN 650 MG: 325 TABLET ORAL at 20:18

## 2019-02-23 RX ADMIN — GLYCOPYRROLATE AND FORMOTEROL FUMARATE 2 PUFF: 9; 4.8 AEROSOL, METERED RESPIRATORY (INHALATION) at 20:39

## 2019-02-23 RX ADMIN — FOLIC ACID 1 MG: 1 TABLET ORAL at 09:34

## 2019-02-23 RX ADMIN — PIPERACILLIN SODIUM,TAZOBACTAM SODIUM 3.38 G: 3; .375 INJECTION, POWDER, FOR SOLUTION INTRAVENOUS at 00:54

## 2019-02-23 RX ADMIN — LEVOFLOXACIN 750 MG: 5 INJECTION, SOLUTION INTRAVENOUS at 18:25

## 2019-02-23 RX ADMIN — PIPERACILLIN SODIUM,TAZOBACTAM SODIUM 3.38 G: 3; .375 INJECTION, POWDER, FOR SOLUTION INTRAVENOUS at 09:34

## 2019-02-23 RX ADMIN — HALOPERIDOL 10 MG: 5 TABLET ORAL at 21:17

## 2019-02-23 RX ADMIN — DIVALPROEX SODIUM 1000 MG: 500 TABLET, EXTENDED RELEASE ORAL at 21:17

## 2019-02-23 RX ADMIN — ENOXAPARIN SODIUM 40 MG: 40 INJECTION SUBCUTANEOUS at 09:45

## 2019-02-23 RX ADMIN — SODIUM CHLORIDE: 9 INJECTION, SOLUTION INTRAVENOUS at 14:12

## 2019-02-23 RX ADMIN — BENZTROPINE MESYLATE 1 MG: 1 TABLET ORAL at 21:17

## 2019-02-23 RX ADMIN — ACETAMINOPHEN 650 MG: 325 TABLET ORAL at 15:32

## 2019-02-23 RX ADMIN — RISPERIDONE 3 MG: 1 TABLET ORAL at 21:17

## 2019-02-23 RX ADMIN — Medication 100 MG: at 09:34

## 2019-02-23 RX ADMIN — AZITHROMYCIN MONOHYDRATE 500 MG: 500 INJECTION, POWDER, LYOPHILIZED, FOR SOLUTION INTRAVENOUS at 16:38

## 2019-02-23 RX ADMIN — TAMSULOSIN HYDROCHLORIDE 0.4 MG: 0.4 CAPSULE ORAL at 09:34

## 2019-02-23 RX ADMIN — GLYCOPYRROLATE AND FORMOTEROL FUMARATE 2 PUFF: 9; 4.8 AEROSOL, METERED RESPIRATORY (INHALATION) at 09:40

## 2019-02-23 RX ADMIN — ACETAMINOPHEN 650 MG: 325 TABLET ORAL at 04:45

## 2019-02-23 RX ADMIN — CEFTRIAXONE SODIUM 1 G: 1 INJECTION, POWDER, FOR SOLUTION INTRAMUSCULAR; INTRAVENOUS at 21:16

## 2019-02-23 ASSESSMENT — PAIN SCALES - GENERAL
PAINLEVEL_OUTOF10: 0

## 2019-02-23 ASSESSMENT — ENCOUNTER SYMPTOMS
GASTROINTESTINAL NEGATIVE: 1
WHEEZING: 0
SHORTNESS OF BREATH: 0
CHEST TIGHTNESS: 0
RESPIRATORY NEGATIVE: 1
CHOKING: 0
COUGH: 0

## 2019-02-24 LAB
ALBUMIN SERPL-MCNC: 3.1 G/DL (ref 3.5–4.6)
ALP BLD-CCNC: 32 U/L (ref 35–104)
ALT SERPL-CCNC: 29 U/L (ref 0–41)
ANION GAP SERPL CALCULATED.3IONS-SCNC: 16 MEQ/L (ref 9–15)
ANISOCYTOSIS: ABNORMAL
AST SERPL-CCNC: 62 U/L (ref 0–40)
ATYPICAL LYMPHOCYTE RELATIVE PERCENT: 1 %
BANDED NEUTROPHILS RELATIVE PERCENT: 15 %
BASOPHILS ABSOLUTE: 0 K/UL (ref 0–0.2)
BASOPHILS RELATIVE PERCENT: 0.5 %
BILIRUB SERPL-MCNC: <0.2 MG/DL (ref 0.2–0.7)
BUN BLDV-MCNC: 13 MG/DL (ref 8–23)
CALCIUM SERPL-MCNC: 8.1 MG/DL (ref 8.5–9.9)
CHLORIDE BLD-SCNC: 102 MEQ/L (ref 95–107)
CO2: 19 MEQ/L (ref 20–31)
CREAT SERPL-MCNC: 1.81 MG/DL (ref 0.7–1.2)
EOSINOPHILS ABSOLUTE: 0 K/UL (ref 0–0.7)
EOSINOPHILS RELATIVE PERCENT: 0 %
FOLATE: 9.9 NG/ML (ref 7.3–26.1)
GFR AFRICAN AMERICAN: 45.4
GFR NON-AFRICAN AMERICAN: 37.5
GLOBULIN: 3.4 G/DL (ref 2.3–3.5)
GLUCOSE BLD-MCNC: 93 MG/DL (ref 70–99)
HCT VFR BLD CALC: 28.3 % (ref 42–52)
HEMATOLOGY PATH CONSULT: YES
HEMOGLOBIN: 9.6 G/DL (ref 14–18)
LYMPHOCYTES ABSOLUTE: 0.9 K/UL (ref 1–4.8)
LYMPHOCYTES RELATIVE PERCENT: 15 %
MAGNESIUM: 1.6 MG/DL (ref 1.7–2.4)
MCH RBC QN AUTO: 33.6 PG (ref 27–31.3)
MCHC RBC AUTO-ENTMCNC: 33.8 % (ref 33–37)
MCV RBC AUTO: 99.3 FL (ref 80–100)
MONOCYTES ABSOLUTE: 0.5 K/UL (ref 0.2–0.8)
MONOCYTES RELATIVE PERCENT: 8.6 %
NEUTROPHILS ABSOLUTE: 4.3 K/UL (ref 1.4–6.5)
NEUTROPHILS RELATIVE PERCENT: 59 %
PDW BLD-RTO: 14.6 % (ref 11.5–14.5)
PLASMA CELLS PERCENT: 1 %
PLATELET # BLD: 101 K/UL (ref 130–400)
PLATELET SLIDE REVIEW: ABNORMAL
POTASSIUM REFLEX MAGNESIUM: 3.4 MEQ/L (ref 3.4–4.9)
RBC # BLD: 2.85 M/UL (ref 4.7–6.1)
S PYO THROAT QL CULT: NORMAL
SLIDE REVIEW: ABNORMAL
SODIUM BLD-SCNC: 137 MEQ/L (ref 135–144)
TOTAL PROTEIN: 6.5 G/DL (ref 6.3–8)
TSH SERPL DL<=0.05 MIU/L-ACNC: 2.93 UIU/ML (ref 0.44–3.86)
VITAMIN B-12: 316 PG/ML (ref 232–1245)
VITAMIN D 25-HYDROXY: 19.3 NG/ML (ref 30–100)
WBC # BLD: 5.8 K/UL (ref 4.8–10.8)

## 2019-02-24 PROCEDURE — 80053 COMPREHEN METABOLIC PANEL: CPT

## 2019-02-24 PROCEDURE — 36415 COLL VENOUS BLD VENIPUNCTURE: CPT

## 2019-02-24 PROCEDURE — 94760 N-INVAS EAR/PLS OXIMETRY 1: CPT

## 2019-02-24 PROCEDURE — 83735 ASSAY OF MAGNESIUM: CPT

## 2019-02-24 PROCEDURE — 87506 IADNA-DNA/RNA PROBE TQ 6-11: CPT

## 2019-02-24 PROCEDURE — 85025 COMPLETE CBC W/AUTO DIFF WBC: CPT

## 2019-02-24 PROCEDURE — 82746 ASSAY OF FOLIC ACID SERUM: CPT

## 2019-02-24 PROCEDURE — 82306 VITAMIN D 25 HYDROXY: CPT

## 2019-02-24 PROCEDURE — 87324 CLOSTRIDIUM AG IA: CPT

## 2019-02-24 PROCEDURE — 84443 ASSAY THYROID STIM HORMONE: CPT

## 2019-02-24 PROCEDURE — 94640 AIRWAY INHALATION TREATMENT: CPT

## 2019-02-24 PROCEDURE — 99232 SBSQ HOSP IP/OBS MODERATE 35: CPT | Performed by: INTERNAL MEDICINE

## 2019-02-24 PROCEDURE — 6360000002 HC RX W HCPCS: Performed by: INTERNAL MEDICINE

## 2019-02-24 PROCEDURE — 6370000000 HC RX 637 (ALT 250 FOR IP): Performed by: INTERNAL MEDICINE

## 2019-02-24 PROCEDURE — 1210000000 HC MED SURG R&B

## 2019-02-24 PROCEDURE — 6370000000 HC RX 637 (ALT 250 FOR IP): Performed by: NURSE PRACTITIONER

## 2019-02-24 PROCEDURE — 82607 VITAMIN B-12: CPT

## 2019-02-24 PROCEDURE — 87449 NOS EACH ORGANISM AG IA: CPT

## 2019-02-24 PROCEDURE — 2580000003 HC RX 258: Performed by: INTERNAL MEDICINE

## 2019-02-24 PROCEDURE — 87493 C DIFF AMPLIFIED PROBE: CPT

## 2019-02-24 PROCEDURE — 6370000000 HC RX 637 (ALT 250 FOR IP): Performed by: PHYSICIAN ASSISTANT

## 2019-02-24 RX ADMIN — RISPERIDONE 3 MG: 1 TABLET ORAL at 20:31

## 2019-02-24 RX ADMIN — CETIRIZINE HYDROCHLORIDE 10 MG: 10 TABLET, FILM COATED ORAL at 09:38

## 2019-02-24 RX ADMIN — GLYCOPYRROLATE AND FORMOTEROL FUMARATE 2 PUFF: 9; 4.8 AEROSOL, METERED RESPIRATORY (INHALATION) at 20:48

## 2019-02-24 RX ADMIN — ACETAMINOPHEN 650 MG: 325 TABLET ORAL at 00:42

## 2019-02-24 RX ADMIN — Medication 100 MG: at 09:38

## 2019-02-24 RX ADMIN — BENZTROPINE MESYLATE 1 MG: 1 TABLET ORAL at 20:31

## 2019-02-24 RX ADMIN — FOLIC ACID 1 MG: 1 TABLET ORAL at 09:38

## 2019-02-24 RX ADMIN — TAMSULOSIN HYDROCHLORIDE 0.4 MG: 0.4 CAPSULE ORAL at 09:38

## 2019-02-24 RX ADMIN — ACETAMINOPHEN 650 MG: 325 TABLET ORAL at 20:30

## 2019-02-24 RX ADMIN — CEFTRIAXONE SODIUM 1 G: 1 INJECTION, POWDER, FOR SOLUTION INTRAMUSCULAR; INTRAVENOUS at 17:43

## 2019-02-24 RX ADMIN — ACETAMINOPHEN 650 MG: 325 TABLET ORAL at 09:38

## 2019-02-24 RX ADMIN — DIVALPROEX SODIUM 1000 MG: 500 TABLET, EXTENDED RELEASE ORAL at 20:31

## 2019-02-24 RX ADMIN — HALOPERIDOL 10 MG: 5 TABLET ORAL at 20:31

## 2019-02-24 RX ADMIN — GLYCOPYRROLATE AND FORMOTEROL FUMARATE 2 PUFF: 9; 4.8 AEROSOL, METERED RESPIRATORY (INHALATION) at 08:18

## 2019-02-24 ASSESSMENT — ENCOUNTER SYMPTOMS
CHOKING: 0
GASTROINTESTINAL NEGATIVE: 1
WHEEZING: 0
CHEST TIGHTNESS: 0
SHORTNESS OF BREATH: 1
COUGH: 0

## 2019-02-25 LAB
GI BACTERIAL PATHOGENS BY PCR: NORMAL
HEMATOLOGY PATH CONSULT: NORMAL
REASON FOR REJECTION: NORMAL
REJECTED TEST: NORMAL

## 2019-02-25 PROCEDURE — 97110 THERAPEUTIC EXERCISES: CPT

## 2019-02-25 PROCEDURE — 2580000003 HC RX 258: Performed by: INTERNAL MEDICINE

## 2019-02-25 PROCEDURE — 94640 AIRWAY INHALATION TREATMENT: CPT

## 2019-02-25 PROCEDURE — 6370000000 HC RX 637 (ALT 250 FOR IP): Performed by: INTERNAL MEDICINE

## 2019-02-25 PROCEDURE — 1210000000 HC MED SURG R&B

## 2019-02-25 PROCEDURE — 6360000002 HC RX W HCPCS: Performed by: INTERNAL MEDICINE

## 2019-02-25 PROCEDURE — 99232 SBSQ HOSP IP/OBS MODERATE 35: CPT | Performed by: INTERNAL MEDICINE

## 2019-02-25 PROCEDURE — 2580000003 HC RX 258: Performed by: NURSE PRACTITIONER

## 2019-02-25 PROCEDURE — 95816 EEG AWAKE AND DROWSY: CPT

## 2019-02-25 PROCEDURE — 97116 GAIT TRAINING THERAPY: CPT

## 2019-02-25 PROCEDURE — 6370000000 HC RX 637 (ALT 250 FOR IP): Performed by: PHYSICIAN ASSISTANT

## 2019-02-25 PROCEDURE — 6370000000 HC RX 637 (ALT 250 FOR IP): Performed by: NURSE PRACTITIONER

## 2019-02-25 RX ADMIN — Medication 10 ML: at 20:29

## 2019-02-25 RX ADMIN — CEFTRIAXONE SODIUM 1 G: 1 INJECTION, POWDER, FOR SOLUTION INTRAMUSCULAR; INTRAVENOUS at 18:45

## 2019-02-25 RX ADMIN — TAMSULOSIN HYDROCHLORIDE 0.4 MG: 0.4 CAPSULE ORAL at 08:23

## 2019-02-25 RX ADMIN — RISPERIDONE 3 MG: 1 TABLET ORAL at 20:29

## 2019-02-25 RX ADMIN — HALOPERIDOL 10 MG: 5 TABLET ORAL at 20:29

## 2019-02-25 RX ADMIN — CETIRIZINE HYDROCHLORIDE 10 MG: 10 TABLET, FILM COATED ORAL at 08:23

## 2019-02-25 RX ADMIN — FOLIC ACID 1 MG: 1 TABLET ORAL at 08:23

## 2019-02-25 RX ADMIN — DIVALPROEX SODIUM 1000 MG: 500 TABLET, EXTENDED RELEASE ORAL at 20:29

## 2019-02-25 RX ADMIN — ACETAMINOPHEN 650 MG: 325 TABLET ORAL at 04:47

## 2019-02-25 RX ADMIN — GLYCOPYRROLATE AND FORMOTEROL FUMARATE 2 PUFF: 9; 4.8 AEROSOL, METERED RESPIRATORY (INHALATION) at 21:21

## 2019-02-25 RX ADMIN — BENZTROPINE MESYLATE 1 MG: 1 TABLET ORAL at 20:29

## 2019-02-25 RX ADMIN — LEVOFLOXACIN 750 MG: 5 INJECTION, SOLUTION INTRAVENOUS at 19:27

## 2019-02-25 RX ADMIN — GLYCOPYRROLATE AND FORMOTEROL FUMARATE 2 PUFF: 9; 4.8 AEROSOL, METERED RESPIRATORY (INHALATION) at 08:16

## 2019-02-25 RX ADMIN — Medication 100 MG: at 08:23

## 2019-02-25 RX ADMIN — SODIUM CHLORIDE: 9 INJECTION, SOLUTION INTRAVENOUS at 08:27

## 2019-02-25 ASSESSMENT — ENCOUNTER SYMPTOMS
SHORTNESS OF BREATH: 1
CHOKING: 0
GASTROINTESTINAL NEGATIVE: 1
WHEEZING: 0
CHEST TIGHTNESS: 0
COUGH: 0

## 2019-02-26 LAB
ANION GAP SERPL CALCULATED.3IONS-SCNC: 18 MEQ/L (ref 9–15)
BUN BLDV-MCNC: 12 MG/DL (ref 8–23)
C DIFFICILE TOXIN, EIA: NORMAL
CALCIUM SERPL-MCNC: 8.8 MG/DL (ref 8.5–9.9)
CHLORIDE BLD-SCNC: 103 MEQ/L (ref 95–107)
CO2: 21 MEQ/L (ref 20–31)
CREAT SERPL-MCNC: 1.79 MG/DL (ref 0.7–1.2)
GFR AFRICAN AMERICAN: 46
GFR NON-AFRICAN AMERICAN: 38
GLUCOSE BLD-MCNC: 98 MG/DL (ref 70–99)
POTASSIUM SERPL-SCNC: 2.7 MEQ/L (ref 3.4–4.9)
POTASSIUM SERPL-SCNC: 3.2 MEQ/L (ref 3.4–4.9)
SODIUM BLD-SCNC: 142 MEQ/L (ref 135–144)

## 2019-02-26 PROCEDURE — 94640 AIRWAY INHALATION TREATMENT: CPT

## 2019-02-26 PROCEDURE — 6370000000 HC RX 637 (ALT 250 FOR IP): Performed by: PHYSICIAN ASSISTANT

## 2019-02-26 PROCEDURE — 6370000000 HC RX 637 (ALT 250 FOR IP): Performed by: INTERNAL MEDICINE

## 2019-02-26 PROCEDURE — 1210000000 HC MED SURG R&B

## 2019-02-26 PROCEDURE — 36415 COLL VENOUS BLD VENIPUNCTURE: CPT

## 2019-02-26 PROCEDURE — 6360000002 HC RX W HCPCS: Performed by: NURSE PRACTITIONER

## 2019-02-26 PROCEDURE — 99232 SBSQ HOSP IP/OBS MODERATE 35: CPT | Performed by: INTERNAL MEDICINE

## 2019-02-26 PROCEDURE — 6360000002 HC RX W HCPCS: Performed by: INTERNAL MEDICINE

## 2019-02-26 PROCEDURE — 80048 BASIC METABOLIC PNL TOTAL CA: CPT

## 2019-02-26 PROCEDURE — 2580000003 HC RX 258: Performed by: INTERNAL MEDICINE

## 2019-02-26 PROCEDURE — 6370000000 HC RX 637 (ALT 250 FOR IP): Performed by: NURSE PRACTITIONER

## 2019-02-26 PROCEDURE — 97116 GAIT TRAINING THERAPY: CPT

## 2019-02-26 PROCEDURE — 84132 ASSAY OF SERUM POTASSIUM: CPT

## 2019-02-26 PROCEDURE — 94761 N-INVAS EAR/PLS OXIMETRY MLT: CPT

## 2019-02-26 PROCEDURE — 97110 THERAPEUTIC EXERCISES: CPT

## 2019-02-26 PROCEDURE — 2580000003 HC RX 258: Performed by: NURSE PRACTITIONER

## 2019-02-26 RX ORDER — POTASSIUM CHLORIDE 20 MEQ/1
40 TABLET, EXTENDED RELEASE ORAL
Status: COMPLETED | OUTPATIENT
Start: 2019-02-26 | End: 2019-02-26

## 2019-02-26 RX ORDER — MAGNESIUM SULFATE HEPTAHYDRATE 500 MG/ML
1 INJECTION, SOLUTION INTRAMUSCULAR; INTRAVENOUS ONCE
Status: DISCONTINUED | OUTPATIENT
Start: 2019-02-26 | End: 2019-02-26 | Stop reason: CLARIF

## 2019-02-26 RX ORDER — MAGNESIUM SULFATE 1 G/100ML
1 INJECTION INTRAVENOUS ONCE
Status: COMPLETED | OUTPATIENT
Start: 2019-02-26 | End: 2019-02-26

## 2019-02-26 RX ADMIN — RISPERIDONE 3 MG: 1 TABLET ORAL at 21:20

## 2019-02-26 RX ADMIN — GLYCOPYRROLATE AND FORMOTEROL FUMARATE 2 PUFF: 9; 4.8 AEROSOL, METERED RESPIRATORY (INHALATION) at 07:51

## 2019-02-26 RX ADMIN — POTASSIUM CHLORIDE 40 MEQ: 20 TABLET, EXTENDED RELEASE ORAL at 15:23

## 2019-02-26 RX ADMIN — GLYCOPYRROLATE AND FORMOTEROL FUMARATE 2 PUFF: 9; 4.8 AEROSOL, METERED RESPIRATORY (INHALATION) at 20:31

## 2019-02-26 RX ADMIN — TAMSULOSIN HYDROCHLORIDE 0.4 MG: 0.4 CAPSULE ORAL at 09:37

## 2019-02-26 RX ADMIN — CEFTRIAXONE SODIUM 1 G: 1 INJECTION, POWDER, FOR SOLUTION INTRAMUSCULAR; INTRAVENOUS at 18:38

## 2019-02-26 RX ADMIN — HALOPERIDOL 10 MG: 5 TABLET ORAL at 21:19

## 2019-02-26 RX ADMIN — MAGNESIUM SULFATE HEPTAHYDRATE 1 G: 1 INJECTION, SOLUTION INTRAVENOUS at 15:23

## 2019-02-26 RX ADMIN — Medication 10 ML: at 21:00

## 2019-02-26 RX ADMIN — BENZTROPINE MESYLATE 1 MG: 1 TABLET ORAL at 21:20

## 2019-02-26 RX ADMIN — POTASSIUM CHLORIDE 40 MEQ: 20 TABLET, EXTENDED RELEASE ORAL at 18:40

## 2019-02-26 RX ADMIN — FOLIC ACID 1 MG: 1 TABLET ORAL at 09:37

## 2019-02-26 RX ADMIN — CETIRIZINE HYDROCHLORIDE 10 MG: 10 TABLET, FILM COATED ORAL at 09:37

## 2019-02-26 RX ADMIN — Medication 10 ML: at 09:38

## 2019-02-26 RX ADMIN — Medication 100 MG: at 09:37

## 2019-02-26 RX ADMIN — DIVALPROEX SODIUM 1000 MG: 500 TABLET, EXTENDED RELEASE ORAL at 21:19

## 2019-02-26 ASSESSMENT — ENCOUNTER SYMPTOMS
GASTROINTESTINAL NEGATIVE: 1
WHEEZING: 0
COUGH: 0
SHORTNESS OF BREATH: 1
CHEST TIGHTNESS: 0
CHOKING: 0

## 2019-02-26 ASSESSMENT — PAIN SCALES - GENERAL: PAINLEVEL_OUTOF10: 0

## 2019-02-27 LAB
ANION GAP SERPL CALCULATED.3IONS-SCNC: 15 MEQ/L (ref 9–15)
BASOPHILS ABSOLUTE: 0 K/UL (ref 0–0.2)
BASOPHILS RELATIVE PERCENT: 0.5 %
BILIRUBIN URINE: NEGATIVE
BLOOD CULTURE, ROUTINE: NORMAL
BLOOD, URINE: NEGATIVE
BUN BLDV-MCNC: 16 MG/DL (ref 8–23)
CALCIUM SERPL-MCNC: 8.6 MG/DL (ref 8.5–9.9)
CHLORIDE BLD-SCNC: 102 MEQ/L (ref 95–107)
CLARITY: CLEAR
CO2: 20 MEQ/L (ref 20–31)
COLOR: YELLOW
CREAT SERPL-MCNC: 1.94 MG/DL (ref 0.7–1.2)
CREATININE URINE: 63.6 MG/DL
CULTURE, BLOOD 2: NORMAL
EOSINOPHILS ABSOLUTE: 0.1 K/UL (ref 0–0.7)
EOSINOPHILS RELATIVE PERCENT: 2.3 %
GFR AFRICAN AMERICAN: 41.9
GFR NON-AFRICAN AMERICAN: 34.6
GLUCOSE BLD-MCNC: 143 MG/DL (ref 70–99)
GLUCOSE URINE: NEGATIVE MG/DL
HCT VFR BLD CALC: 28.8 % (ref 42–52)
HEMOGLOBIN: 10.2 G/DL (ref 14–18)
KETONES, URINE: NEGATIVE MG/DL
L. PNEUMOPHILA SEROGP 1 UR AG: NEGATIVE
LEUKOCYTE ESTERASE, URINE: NEGATIVE
LYMPHOCYTES ABSOLUTE: 0.7 K/UL (ref 1–4.8)
LYMPHOCYTES RELATIVE PERCENT: 12.1 %
MCH RBC QN AUTO: 35.2 PG (ref 27–31.3)
MCHC RBC AUTO-ENTMCNC: 35.2 % (ref 33–37)
MCV RBC AUTO: 99.9 FL (ref 80–100)
MONOCYTES ABSOLUTE: 0.6 K/UL (ref 0.2–0.8)
MONOCYTES RELATIVE PERCENT: 10.7 %
NEUTROPHILS ABSOLUTE: 4.1 K/UL (ref 1.4–6.5)
NEUTROPHILS RELATIVE PERCENT: 74.4 %
NITRITE, URINE: NEGATIVE
PDW BLD-RTO: 14.8 % (ref 11.5–14.5)
PH UA: 6.5 (ref 5–9)
PLATELET # BLD: 216 K/UL (ref 130–400)
POTASSIUM SERPL-SCNC: 3 MEQ/L (ref 3.4–4.9)
POTASSIUM SERPL-SCNC: 3 MEQ/L (ref 3.4–4.9)
POTASSIUM SERPL-SCNC: 3.4 MEQ/L (ref 3.4–4.9)
PROTEIN PROTEIN: 21 MG/DL
PROTEIN UA: NEGATIVE MG/DL
PROTEIN/CREAT RATIO: 0.3 ML/ML
PROTEIN/CREAT RATIO: 0.3 ML/ML (ref 0–0.2)
RBC # BLD: 2.89 M/UL (ref 4.7–6.1)
SODIUM BLD-SCNC: 137 MEQ/L (ref 135–144)
SPECIFIC GRAVITY UA: 1.01 (ref 1–1.03)
UROBILINOGEN, URINE: 0.2 E.U./DL
WBC # BLD: 5.5 K/UL (ref 4.8–10.8)

## 2019-02-27 PROCEDURE — 6370000000 HC RX 637 (ALT 250 FOR IP): Performed by: NURSE PRACTITIONER

## 2019-02-27 PROCEDURE — 1210000000 HC MED SURG R&B

## 2019-02-27 PROCEDURE — 2580000003 HC RX 258: Performed by: NURSE PRACTITIONER

## 2019-02-27 PROCEDURE — 36415 COLL VENOUS BLD VENIPUNCTURE: CPT

## 2019-02-27 PROCEDURE — 6370000000 HC RX 637 (ALT 250 FOR IP): Performed by: PHYSICIAN ASSISTANT

## 2019-02-27 PROCEDURE — 6370000000 HC RX 637 (ALT 250 FOR IP): Performed by: INTERNAL MEDICINE

## 2019-02-27 PROCEDURE — 81003 URINALYSIS AUTO W/O SCOPE: CPT

## 2019-02-27 PROCEDURE — 97116 GAIT TRAINING THERAPY: CPT

## 2019-02-27 PROCEDURE — 84156 ASSAY OF PROTEIN URINE: CPT

## 2019-02-27 PROCEDURE — 85025 COMPLETE CBC W/AUTO DIFF WBC: CPT

## 2019-02-27 PROCEDURE — 6360000002 HC RX W HCPCS: Performed by: INTERNAL MEDICINE

## 2019-02-27 PROCEDURE — 2580000003 HC RX 258: Performed by: STUDENT IN AN ORGANIZED HEALTH CARE EDUCATION/TRAINING PROGRAM

## 2019-02-27 PROCEDURE — 84132 ASSAY OF SERUM POTASSIUM: CPT

## 2019-02-27 PROCEDURE — 94640 AIRWAY INHALATION TREATMENT: CPT

## 2019-02-27 PROCEDURE — 6360000002 HC RX W HCPCS: Performed by: NURSE PRACTITIONER

## 2019-02-27 PROCEDURE — 99222 1ST HOSP IP/OBS MODERATE 55: CPT | Performed by: UROLOGY

## 2019-02-27 PROCEDURE — 99232 SBSQ HOSP IP/OBS MODERATE 35: CPT | Performed by: INTERNAL MEDICINE

## 2019-02-27 PROCEDURE — 2580000003 HC RX 258: Performed by: INTERNAL MEDICINE

## 2019-02-27 PROCEDURE — 80048 BASIC METABOLIC PNL TOTAL CA: CPT

## 2019-02-27 RX ORDER — POTASSIUM CHLORIDE 20 MEQ/1
40 TABLET, EXTENDED RELEASE ORAL
Status: COMPLETED | OUTPATIENT
Start: 2019-02-27 | End: 2019-02-27

## 2019-02-27 RX ORDER — L. ACIDOPHILUS/L.BULGARICUS 1MM CELL
2 TABLET ORAL 3 TIMES DAILY
Status: DISCONTINUED | OUTPATIENT
Start: 2019-02-27 | End: 2019-02-28 | Stop reason: HOSPADM

## 2019-02-27 RX ORDER — LEVOFLOXACIN 500 MG/1
500 TABLET, FILM COATED ORAL DAILY
Qty: 7 TABLET | Refills: 0 | Status: SHIPPED | OUTPATIENT
Start: 2019-02-27 | End: 2019-03-06

## 2019-02-27 RX ADMIN — ENOXAPARIN SODIUM 40 MG: 40 INJECTION SUBCUTANEOUS at 08:01

## 2019-02-27 RX ADMIN — CEFTRIAXONE SODIUM 1 G: 1 INJECTION, POWDER, FOR SOLUTION INTRAMUSCULAR; INTRAVENOUS at 18:16

## 2019-02-27 RX ADMIN — POTASSIUM CHLORIDE 40 MEQ: 20 TABLET, EXTENDED RELEASE ORAL at 16:04

## 2019-02-27 RX ADMIN — TAMSULOSIN HYDROCHLORIDE 0.4 MG: 0.4 CAPSULE ORAL at 08:01

## 2019-02-27 RX ADMIN — POTASSIUM CHLORIDE 40 MEQ: 20 TABLET, EXTENDED RELEASE ORAL at 13:39

## 2019-02-27 RX ADMIN — Medication 10 ML: at 21:06

## 2019-02-27 RX ADMIN — SODIUM CHLORIDE: 9 INJECTION, SOLUTION INTRAVENOUS at 18:16

## 2019-02-27 RX ADMIN — Medication 10 ML: at 08:02

## 2019-02-27 RX ADMIN — BENZTROPINE MESYLATE 1 MG: 1 TABLET ORAL at 21:06

## 2019-02-27 RX ADMIN — SODIUM CHLORIDE: 9 INJECTION, SOLUTION INTRAVENOUS at 09:06

## 2019-02-27 RX ADMIN — Medication 100 MG: at 08:01

## 2019-02-27 RX ADMIN — CETIRIZINE HYDROCHLORIDE 10 MG: 10 TABLET, FILM COATED ORAL at 08:01

## 2019-02-27 RX ADMIN — HALOPERIDOL 10 MG: 5 TABLET ORAL at 21:06

## 2019-02-27 RX ADMIN — LEVOFLOXACIN 750 MG: 5 INJECTION, SOLUTION INTRAVENOUS at 19:44

## 2019-02-27 RX ADMIN — LACTOBACILLUS TAB 2 TABLET: TAB at 16:04

## 2019-02-27 RX ADMIN — FOLIC ACID 1 MG: 1 TABLET ORAL at 13:40

## 2019-02-27 RX ADMIN — RISPERIDONE 3 MG: 1 TABLET ORAL at 21:06

## 2019-02-27 RX ADMIN — LACTOBACILLUS TAB 2 TABLET: TAB at 21:06

## 2019-02-27 RX ADMIN — GLYCOPYRROLATE AND FORMOTEROL FUMARATE 2 PUFF: 9; 4.8 AEROSOL, METERED RESPIRATORY (INHALATION) at 07:31

## 2019-02-27 RX ADMIN — DIVALPROEX SODIUM 1000 MG: 500 TABLET, EXTENDED RELEASE ORAL at 21:05

## 2019-02-27 ASSESSMENT — ENCOUNTER SYMPTOMS
GASTROINTESTINAL NEGATIVE: 1
CHEST TIGHTNESS: 0
WHEEZING: 0
COUGH: 0
CHOKING: 0
SHORTNESS OF BREATH: 1

## 2019-02-27 ASSESSMENT — PAIN SCALES - GENERAL
PAINLEVEL_OUTOF10: 0
PAINLEVEL_OUTOF10: 0

## 2019-02-28 VITALS
DIASTOLIC BLOOD PRESSURE: 69 MMHG | RESPIRATION RATE: 16 BRPM | HEIGHT: 69 IN | OXYGEN SATURATION: 97 % | WEIGHT: 144.62 LBS | SYSTOLIC BLOOD PRESSURE: 133 MMHG | HEART RATE: 94 BPM | TEMPERATURE: 98.5 F | BODY MASS INDEX: 21.42 KG/M2

## 2019-02-28 PROBLEM — N13.9 OBSTRUCTIVE UROPATHY: Status: ACTIVE | Noted: 2019-02-28

## 2019-02-28 PROBLEM — G93.40 ENCEPHALOPATHY: Status: ACTIVE | Noted: 2019-02-28

## 2019-02-28 PROBLEM — N17.9 AKI (ACUTE KIDNEY INJURY) (HCC): Status: ACTIVE | Noted: 2019-02-28

## 2019-02-28 LAB
ANION GAP SERPL CALCULATED.3IONS-SCNC: 15 MEQ/L (ref 9–15)
BUN BLDV-MCNC: 10 MG/DL (ref 8–23)
CALCIUM SERPL-MCNC: 7.9 MG/DL (ref 8.5–9.9)
CHLORIDE BLD-SCNC: 105 MEQ/L (ref 95–107)
CO2: 20 MEQ/L (ref 20–31)
CREAT SERPL-MCNC: 1.53 MG/DL (ref 0.7–1.2)
GFR AFRICAN AMERICAN: 55.1
GFR NON-AFRICAN AMERICAN: 45.6
GLUCOSE BLD-MCNC: 136 MG/DL (ref 70–99)
HCT VFR BLD CALC: 31.5 % (ref 42–52)
HEMOGLOBIN: 11 G/DL (ref 14–18)
MCH RBC QN AUTO: 34 PG (ref 27–31.3)
MCHC RBC AUTO-ENTMCNC: 34.8 % (ref 33–37)
MCV RBC AUTO: 97.9 FL (ref 80–100)
PDW BLD-RTO: 14.6 % (ref 11.5–14.5)
PLATELET # BLD: 308 K/UL (ref 130–400)
POTASSIUM SERPL-SCNC: 3.4 MEQ/L (ref 3.4–4.9)
POTASSIUM SERPL-SCNC: 3.4 MEQ/L (ref 3.4–4.9)
RBC # BLD: 3.22 M/UL (ref 4.7–6.1)
SODIUM BLD-SCNC: 140 MEQ/L (ref 135–144)
WBC # BLD: 10.8 K/UL (ref 4.8–10.8)

## 2019-02-28 PROCEDURE — 6360000002 HC RX W HCPCS: Performed by: NURSE PRACTITIONER

## 2019-02-28 PROCEDURE — 2580000003 HC RX 258: Performed by: STUDENT IN AN ORGANIZED HEALTH CARE EDUCATION/TRAINING PROGRAM

## 2019-02-28 PROCEDURE — 99231 SBSQ HOSP IP/OBS SF/LOW 25: CPT | Performed by: UROLOGY

## 2019-02-28 PROCEDURE — 36415 COLL VENOUS BLD VENIPUNCTURE: CPT

## 2019-02-28 PROCEDURE — 99232 SBSQ HOSP IP/OBS MODERATE 35: CPT | Performed by: INTERNAL MEDICINE

## 2019-02-28 PROCEDURE — 2580000003 HC RX 258: Performed by: NURSE PRACTITIONER

## 2019-02-28 PROCEDURE — 6370000000 HC RX 637 (ALT 250 FOR IP): Performed by: PHYSICIAN ASSISTANT

## 2019-02-28 PROCEDURE — 6370000000 HC RX 637 (ALT 250 FOR IP): Performed by: NURSE PRACTITIONER

## 2019-02-28 PROCEDURE — 85027 COMPLETE CBC AUTOMATED: CPT

## 2019-02-28 PROCEDURE — 80048 BASIC METABOLIC PNL TOTAL CA: CPT

## 2019-02-28 PROCEDURE — 84132 ASSAY OF SERUM POTASSIUM: CPT

## 2019-02-28 PROCEDURE — 97116 GAIT TRAINING THERAPY: CPT

## 2019-02-28 PROCEDURE — 94640 AIRWAY INHALATION TREATMENT: CPT

## 2019-02-28 RX ORDER — MELATONIN
1000 DAILY
Qty: 90 TABLET | Refills: 1 | Status: SHIPPED | OUTPATIENT
Start: 2019-02-28 | End: 2020-02-18 | Stop reason: ALTCHOICE

## 2019-02-28 RX ADMIN — SODIUM CHLORIDE: 9 INJECTION, SOLUTION INTRAVENOUS at 05:19

## 2019-02-28 RX ADMIN — LACTOBACILLUS TAB 2 TABLET: TAB at 09:18

## 2019-02-28 RX ADMIN — CETIRIZINE HYDROCHLORIDE 10 MG: 10 TABLET, FILM COATED ORAL at 09:18

## 2019-02-28 RX ADMIN — TAMSULOSIN HYDROCHLORIDE 0.4 MG: 0.4 CAPSULE ORAL at 09:18

## 2019-02-28 RX ADMIN — ENOXAPARIN SODIUM 40 MG: 40 INJECTION SUBCUTANEOUS at 09:18

## 2019-02-28 RX ADMIN — GLYCOPYRROLATE AND FORMOTEROL FUMARATE 2 PUFF: 9; 4.8 AEROSOL, METERED RESPIRATORY (INHALATION) at 07:42

## 2019-02-28 RX ADMIN — LACTOBACILLUS TAB 2 TABLET: TAB at 14:38

## 2019-02-28 RX ADMIN — GLYCOPYRROLATE AND FORMOTEROL FUMARATE 2 PUFF: 9; 4.8 AEROSOL, METERED RESPIRATORY (INHALATION) at 19:21

## 2019-02-28 RX ADMIN — Medication 10 ML: at 09:19

## 2019-02-28 RX ADMIN — Medication 100 MG: at 09:18

## 2019-02-28 RX ADMIN — FOLIC ACID 1 MG: 1 TABLET ORAL at 09:18

## 2019-02-28 ASSESSMENT — ENCOUNTER SYMPTOMS
SHORTNESS OF BREATH: 1
WHEEZING: 0
GASTROINTESTINAL NEGATIVE: 1
CHOKING: 0
CHEST TIGHTNESS: 0
COUGH: 0

## 2019-02-28 ASSESSMENT — PAIN SCALES - GENERAL
PAINLEVEL_OUTOF10: 0

## 2020-02-18 ENCOUNTER — OFFICE VISIT (OUTPATIENT)
Dept: CARDIOLOGY CLINIC | Age: 70
End: 2020-02-18
Payer: MEDICARE

## 2020-02-18 VITALS
RESPIRATION RATE: 20 BRPM | DIASTOLIC BLOOD PRESSURE: 83 MMHG | OXYGEN SATURATION: 97 % | HEART RATE: 87 BPM | SYSTOLIC BLOOD PRESSURE: 122 MMHG

## 2020-02-18 PROBLEM — Z87.891 HISTORY OF TOBACCO ABUSE: Status: ACTIVE | Noted: 2020-02-18

## 2020-02-18 PROBLEM — G90.9 AUTONOMIC DYSFUNCTION: Status: ACTIVE | Noted: 2020-02-18

## 2020-02-18 PROBLEM — R06.02 SHORTNESS OF BREATH: Status: ACTIVE | Noted: 2020-02-18

## 2020-02-18 PROCEDURE — 99204 OFFICE O/P NEW MOD 45 MIN: CPT | Performed by: INTERNAL MEDICINE

## 2020-02-18 PROCEDURE — G8484 FLU IMMUNIZE NO ADMIN: HCPCS | Performed by: INTERNAL MEDICINE

## 2020-02-18 PROCEDURE — 3017F COLORECTAL CA SCREEN DOC REV: CPT | Performed by: INTERNAL MEDICINE

## 2020-02-18 PROCEDURE — G8420 CALC BMI NORM PARAMETERS: HCPCS | Performed by: INTERNAL MEDICINE

## 2020-02-18 PROCEDURE — 1036F TOBACCO NON-USER: CPT | Performed by: INTERNAL MEDICINE

## 2020-02-18 PROCEDURE — G8427 DOCREV CUR MEDS BY ELIG CLIN: HCPCS | Performed by: INTERNAL MEDICINE

## 2020-02-18 PROCEDURE — 4040F PNEUMOC VAC/ADMIN/RCVD: CPT | Performed by: INTERNAL MEDICINE

## 2020-02-18 PROCEDURE — 1123F ACP DISCUSS/DSCN MKR DOCD: CPT | Performed by: INTERNAL MEDICINE

## 2020-02-18 RX ORDER — GUAIFENESIN 600 MG/1
TABLET, EXTENDED RELEASE ORAL
COMMUNITY
Start: 2020-02-07

## 2020-02-18 RX ORDER — HALOPERIDOL 2 MG/1
TABLET ORAL
COMMUNITY
Start: 2020-02-06

## 2020-02-18 RX ORDER — BISACODYL 10 MG
SUPPOSITORY, RECTAL RECTAL
COMMUNITY
Start: 2020-01-24

## 2020-02-18 RX ORDER — RISPERIDONE 2 MG/1
TABLET, FILM COATED ORAL
COMMUNITY
Start: 2020-01-24

## 2020-02-18 RX ORDER — DIVALPROEX SODIUM 125 MG/1
1000 CAPSULE, COATED PELLETS ORAL
COMMUNITY
Start: 2020-02-13

## 2020-02-18 RX ORDER — FUROSEMIDE 20 MG/1
TABLET ORAL
COMMUNITY
Start: 2020-02-06

## 2020-02-18 RX ORDER — DOCUSATE SODIUM 100 MG/1
CAPSULE, LIQUID FILLED ORAL
COMMUNITY
Start: 2020-02-06

## 2020-02-18 RX ORDER — LEVOTHYROXINE SODIUM 0.05 MG/1
TABLET ORAL
COMMUNITY
Start: 2020-02-08

## 2020-02-18 RX ORDER — POTASSIUM CHLORIDE 750 MG/1
TABLET, FILM COATED, EXTENDED RELEASE ORAL
COMMUNITY
Start: 2020-02-12

## 2020-02-18 RX ORDER — LORATADINE 10 MG/1
TABLET ORAL
COMMUNITY
Start: 2020-01-24

## 2020-02-18 NOTE — PROGRESS NOTES
Chief Complaint   Patient presents with   Dimitris Marcus Cardiologist     Self referred by daughter - patient does see Dr. Yaz Haro at [facility]       Patient presents for initial medical evaluation. Patient is followed on a regular basis by Dr. Jaqui Albright primary care provider on file. Grey June Hx autonomic dysfunction. Doing better since being weaned off of some of the psych meds and initiation of lopressor. He is on hospice now. He is in wheelchair. No hx of CVA or parkinson's. He has severe mucus at night and SOB. Family states he is filling up with fluids and gets relief with lasix. No hx of MI or arrhythmia. No hx of stress test or LHC. His diet could be better, he eats a lot of processed food. Does have CKD, stage III. + hx of smoking. No DM. EKG with ST.   Uses a walker at home. Per his family, patient wants to live and have further workup performed. Apparently was walking, driving, shopping last year. s/p ECHO in 3/19:    CONCLUSIONS:  - Technically difficult exam due to body habitus and suboptimal positioning.  - Exam indication: Hypotension  - The left ventricle is normal in size. Left ventricular systolic function is   mildly decreased. EF = 50 ± 5% (2D biplane) Grade I left ventricular diastolic   dysfunction.  - The right ventricle is normal in size. Right ventricular systolic function is   normal.  - There are no significant valvular abnormalities. - The patient has not had a prior CC echocardiographic exam for comparison.     Patient Active Problem List   Diagnosis    Abscess    Cellulitis of right upper extremity    Staph skin infection    Schizoaffective disorder, bipolar type ([address]    MSSA (methicillin susceptible Staphylococcus aureus) infection    Voiding difficulty    Abscess of arm, right    COPD (chronic obstructive pulmonary disease) ([address]    Fever    Community acquired pneumonia    Urinary retention    Obstructive uropathy    KATE (acute kidney injury) ([address]  Encephalopathy    Autonomic dysfunction    History of tobacco abuse    Shortness of breath       Past Surgical History:   Procedure Laterality Date    INCISION AND DRAINAGE Right 2016    ARM INCISION AND DRAINAGE with  debridement performed by Helena Cisneros MD at [facility] History     Socioeconomic History    Marital status: Single     Spouse name: None    Number of children: None    Years of education: None    Highest education level: None   Occupational History    None   Social Needs    Financial resource strain: None    Food insecurity:     Worry: None     Inability: None    Transportation needs:     Medical: None     Non-medical: None   Tobacco Use    Smoking status: Former Smoker     Packs/day: 2.00     Years: 25.00     Pack years: 50.00     Types: Cigarettes     Last attempt to quit: 2019     Years since quittin.0    Smokeless tobacco: Never Used   Substance and Sexual Activity    Alcohol use: No    Drug use: Not Currently     Types: Marijuana     Comment: quit about 1 year ago    Sexual activity: Not Currently   Lifestyle    Physical activity:     Days per week: None     Minutes per session: None    Stress: None   Relationships    Social connections:     Talks on phone: None     Gets together: None     Attends Voodoo service: None     Active member of club or organization: None     Attends meetings of clubs or organizations: None     Relationship status: None    Intimate partner violence:     Fear of current or ex partner: None     Emotionally abused: None     Physically abused: None     Forced sexual activity: None   Other Topics Concern    None   Social History Narrative    None       Family History   Problem Relation Age of Onset    Hypertension Mother     No Known Problems Father        Current Outpatient Medications   Medication Sig Dispense Refill    bisacodyl (DULCOLAX) 10 MG suppository USE 1 SUPPOSITORY RECTALLY QD PRF CONSTIPATION      divalproex (DEPAKOTE SPRINKLE) 125 MG capsule 1,000 mg       MG capsule TK ONE C PO  BID FOR CONSTIPATION      furosemide (LASIX) 20 MG tablet TK 1 T PO BID      MUCUS RELIEF 600 MG extended release tablet TK ONE T PO Q TWELVE H PRF COUGH      haloperidol (HALDOL) 2 MG tablet TK 1 TO 2 TS PO HS      levothyroxine (SYNTHROID) 50 MCG tablet TK 1 T PO  D      loratadine (CLARITIN) 10 MG tablet TK 1 T PO  ONCE D FOR ALLERGY SYMPTOMS      metoprolol tartrate (LOPRESSOR) 25 MG tablet TK ONE T PO BID      potassium chloride (KLOR-CON) 10 MEQ extended release tablet TK 1 T PO QD      risperiDONE (RISPERDAL) 2 MG tablet TK 1 T PO HS      albuterol sulfate  (90 Base) MCG/ACT inhaler Inhale 2 puffs into the lungs every 6 hours as needed for Wheezing      CRANBERRY CONCENTRATE PO Take by mouth      benztropine (COGENTIN) 1 MG tablet 1 mg 2 times daily   2     No current facility-administered medications for this visit. Bee venom; Benadryl [diphenhydramine]; and Tylenol [acetaminophen]    Review of Systems:  General ROS: negative  Psychological ROS: negative  Hematological and Lymphatic ROS: No history of blood clots or bleeding disorder. Respiratory ROS: positive for - shortness of breath  Cardiovascular ROS: positive for - dyspnea on exertion  Gastrointestinal ROS: no abdominal pain, change in bowel habits, or black or bloody stools  Genito-Urinary ROS: no dysuria, trouble voiding, or hematuria  Musculoskeletal ROS: negative  Neurological ROS: no TIA or stroke symptoms  Dermatological ROS: negative    VITALS:  Blood pressure 122/83, pulse 87, resp. rate 20, SpO2 97 %. There is no height or weight on file to calculate BMI. Physical Examination:  General appearance - alert, well appearing, and in no distress  Mental status - alert, oriented to person, place, and time  Neck - Neck is supple, no JVD or carotid bruits. No thyromegaly or adenopathy.    Chest - clear to auscultation, no wheezes, edema. Conservative med rx.